# Patient Record
Sex: MALE | Race: WHITE | Employment: UNEMPLOYED | URBAN - METROPOLITAN AREA
[De-identification: names, ages, dates, MRNs, and addresses within clinical notes are randomized per-mention and may not be internally consistent; named-entity substitution may affect disease eponyms.]

---

## 2018-04-27 ENCOUNTER — HOSPITAL ENCOUNTER (OUTPATIENT)
Dept: NUCLEAR MEDICINE | Age: 48
Discharge: HOME | End: 2018-04-27
Payer: COMMERCIAL

## 2018-04-27 ENCOUNTER — HOSPITAL ENCOUNTER (OUTPATIENT)
Dept: NON INVASIVE DIAGNOSTICS | Age: 48
Discharge: HOME | End: 2018-04-27
Payer: COMMERCIAL

## 2018-04-27 VITALS — HEIGHT: 75 IN | WEIGHT: 295 LBS | BODY MASS INDEX: 36.68 KG/M2

## 2018-04-27 DIAGNOSIS — I20.9 ANGINAL SYNDROME (HCC): ICD-10-CM

## 2018-04-27 LAB
LV EF: 62 %
LVEF MODALITY: NORMAL

## 2018-04-27 PROCEDURE — 6360000002 HC RX W HCPCS: Performed by: FAMILY MEDICINE

## 2018-04-27 PROCEDURE — 93017 CV STRESS TEST TRACING ONLY: CPT

## 2018-04-27 PROCEDURE — 3430000000 HC RX DIAGNOSTIC RADIOPHARMACEUTICAL: Performed by: RADIOLOGY

## 2018-04-27 PROCEDURE — 78452 HT MUSCLE IMAGE SPECT MULT: CPT

## 2018-04-27 PROCEDURE — A9500 TC99M SESTAMIBI: HCPCS | Performed by: RADIOLOGY

## 2018-04-27 RX ORDER — ESOMEPRAZOLE MAGNESIUM 40 MG/1
40 CAPSULE, DELAYED RELEASE ORAL
COMMUNITY
End: 2018-07-06

## 2018-04-27 RX ORDER — RANITIDINE 150 MG/1
150 CAPSULE ORAL 2 TIMES DAILY
Status: ON HOLD | COMMUNITY
End: 2018-07-07 | Stop reason: HOSPADM

## 2018-04-27 RX ORDER — FENOFIBRATE 145 MG/1
145 TABLET, COATED ORAL DAILY
Status: ON HOLD | COMMUNITY
End: 2018-07-07 | Stop reason: HOSPADM

## 2018-04-27 RX ORDER — IBUPROFEN 800 MG/1
800 TABLET ORAL EVERY 8 HOURS PRN
Status: ON HOLD | COMMUNITY
End: 2018-07-07 | Stop reason: HOSPADM

## 2018-04-27 RX ADMIN — Medication 30 MILLICURIE: at 10:51

## 2018-04-27 RX ADMIN — Medication 10 MILLICURIE: at 08:58

## 2018-04-27 RX ADMIN — REGADENOSON 0.4 MG: 0.08 INJECTION, SOLUTION INTRAVENOUS at 11:13

## 2018-07-02 ENCOUNTER — HOSPITAL ENCOUNTER (OUTPATIENT)
Age: 48
Discharge: HOME | End: 2018-07-02
Payer: COMMERCIAL

## 2018-07-02 LAB
ANION GAP SERPL CALCULATED.3IONS-SCNC: 17 MMOL/L (ref 7–16)
APTT: 27.4 SEC (ref 24.5–35.1)
BUN BLDV-MCNC: 17 MG/DL (ref 6–20)
CALCIUM SERPL-MCNC: 9.4 MG/DL (ref 8.6–10.2)
CHLORIDE BLD-SCNC: 99 MMOL/L (ref 98–107)
CO2: 27 MMOL/L (ref 22–29)
CREAT SERPL-MCNC: 1 MG/DL (ref 0.7–1.2)
GFR AFRICAN AMERICAN: >60
GFR NON-AFRICAN AMERICAN: >60 ML/MIN/1.73
GLUCOSE BLD-MCNC: 97 MG/DL (ref 74–109)
HCT VFR BLD CALC: 45 % (ref 37–54)
HEMOGLOBIN: 15.7 G/DL (ref 12.5–16.5)
INR BLD: 0.9
MCH RBC QN AUTO: 31.8 PG (ref 26–35)
MCHC RBC AUTO-ENTMCNC: 34.9 % (ref 32–34.5)
MCV RBC AUTO: 91.1 FL (ref 80–99.9)
PDW BLD-RTO: 12 FL (ref 11.5–15)
PLATELET # BLD: 294 E9/L (ref 130–450)
PMV BLD AUTO: 10.4 FL (ref 7–12)
POTASSIUM SERPL-SCNC: 4.4 MMOL/L (ref 3.5–5)
PROTHROMBIN TIME: 10.2 SEC (ref 9.3–12.4)
RBC # BLD: 4.94 E12/L (ref 3.8–5.8)
SODIUM BLD-SCNC: 143 MMOL/L (ref 132–146)
WBC # BLD: 8.8 E9/L (ref 4.5–11.5)

## 2018-07-02 PROCEDURE — 85610 PROTHROMBIN TIME: CPT

## 2018-07-02 PROCEDURE — 80048 BASIC METABOLIC PNL TOTAL CA: CPT

## 2018-07-02 PROCEDURE — 85027 COMPLETE CBC AUTOMATED: CPT

## 2018-07-02 PROCEDURE — 36415 COLL VENOUS BLD VENIPUNCTURE: CPT

## 2018-07-02 PROCEDURE — 85730 THROMBOPLASTIN TIME PARTIAL: CPT

## 2018-07-06 ENCOUNTER — HOSPITAL ENCOUNTER (OUTPATIENT)
Dept: CARDIAC CATH/INVASIVE PROCEDURES | Age: 48
Discharge: HOME | End: 2018-07-07
Attending: INTERNAL MEDICINE | Admitting: INTERNAL MEDICINE
Payer: COMMERCIAL

## 2018-07-06 DIAGNOSIS — I25.10 CAD IN NATIVE ARTERY: ICD-10-CM

## 2018-07-06 LAB
ABO/RH: NORMAL
ANTIBODY SCREEN: NORMAL

## 2018-07-06 PROCEDURE — 86900 BLOOD TYPING SEROLOGIC ABO: CPT

## 2018-07-06 PROCEDURE — C1887 CATHETER, GUIDING: HCPCS

## 2018-07-06 PROCEDURE — C1769 GUIDE WIRE: HCPCS

## 2018-07-06 PROCEDURE — 86850 RBC ANTIBODY SCREEN: CPT

## 2018-07-06 PROCEDURE — 6370000000 HC RX 637 (ALT 250 FOR IP)

## 2018-07-06 PROCEDURE — C1874 STENT, COATED/COV W/DEL SYS: HCPCS

## 2018-07-06 PROCEDURE — 92928 PRQ TCAT PLMT NTRAC ST 1 LES: CPT | Performed by: INTERNAL MEDICINE

## 2018-07-06 PROCEDURE — 6360000002 HC RX W HCPCS

## 2018-07-06 PROCEDURE — 93458 L HRT ARTERY/VENTRICLE ANGIO: CPT | Performed by: INTERNAL MEDICINE

## 2018-07-06 PROCEDURE — C1725 CATH, TRANSLUMIN NON-LASER: HCPCS

## 2018-07-06 PROCEDURE — 36415 COLL VENOUS BLD VENIPUNCTURE: CPT

## 2018-07-06 PROCEDURE — C1760 CLOSURE DEV, VASC: HCPCS

## 2018-07-06 PROCEDURE — 2709999900 HC NON-CHARGEABLE SUPPLY

## 2018-07-06 PROCEDURE — 6370000000 HC RX 637 (ALT 250 FOR IP): Performed by: INTERNAL MEDICINE

## 2018-07-06 PROCEDURE — 2500000003 HC RX 250 WO HCPCS

## 2018-07-06 PROCEDURE — 2580000003 HC RX 258: Performed by: INTERNAL MEDICINE

## 2018-07-06 PROCEDURE — 86901 BLOOD TYPING SEROLOGIC RH(D): CPT

## 2018-07-06 PROCEDURE — C1894 INTRO/SHEATH, NON-LASER: HCPCS

## 2018-07-06 RX ORDER — ASPIRIN 81 MG/1
81 TABLET, CHEWABLE ORAL DAILY
Status: DISCONTINUED | OUTPATIENT
Start: 2018-07-07 | End: 2018-07-07 | Stop reason: HOSPADM

## 2018-07-06 RX ORDER — ROSUVASTATIN CALCIUM 10 MG/1
20 TABLET, COATED ORAL DAILY
Status: DISCONTINUED | OUTPATIENT
Start: 2018-07-06 | End: 2018-07-07 | Stop reason: HOSPADM

## 2018-07-06 RX ORDER — SODIUM CHLORIDE 0.9 % (FLUSH) 0.9 %
10 SYRINGE (ML) INJECTION PRN
Status: DISCONTINUED | OUTPATIENT
Start: 2018-07-06 | End: 2018-07-07 | Stop reason: HOSPADM

## 2018-07-06 RX ORDER — LISINOPRIL 5 MG/1
5 TABLET ORAL DAILY
Status: DISCONTINUED | OUTPATIENT
Start: 2018-07-06 | End: 2018-07-07 | Stop reason: HOSPADM

## 2018-07-06 RX ORDER — SODIUM CHLORIDE 9 MG/ML
INJECTION, SOLUTION INTRAVENOUS ONCE
Status: COMPLETED | OUTPATIENT
Start: 2018-07-06 | End: 2018-07-06

## 2018-07-06 RX ORDER — SODIUM CHLORIDE 9 MG/ML
INJECTION, SOLUTION INTRAVENOUS ONCE
Status: DISCONTINUED | OUTPATIENT
Start: 2018-07-06 | End: 2018-07-06

## 2018-07-06 RX ORDER — ACETAMINOPHEN 325 MG/1
650 TABLET ORAL EVERY 4 HOURS PRN
Status: DISCONTINUED | OUTPATIENT
Start: 2018-07-06 | End: 2018-07-07 | Stop reason: HOSPADM

## 2018-07-06 RX ORDER — SODIUM CHLORIDE 0.9 % (FLUSH) 0.9 %
10 SYRINGE (ML) INJECTION EVERY 12 HOURS SCHEDULED
Status: DISCONTINUED | OUTPATIENT
Start: 2018-07-06 | End: 2018-07-07 | Stop reason: HOSPADM

## 2018-07-06 RX ADMIN — LISINOPRIL 5 MG: 5 TABLET ORAL at 22:23

## 2018-07-06 RX ADMIN — ROSUVASTATIN CALCIUM 20 MG: 10 TABLET, FILM COATED ORAL at 22:22

## 2018-07-06 RX ADMIN — SODIUM CHLORIDE: 9 INJECTION, SOLUTION INTRAVENOUS at 13:22

## 2018-07-06 RX ADMIN — TICAGRELOR 90 MG: 90 TABLET ORAL at 22:23

## 2018-07-06 RX ADMIN — Medication 10 ML: at 22:23

## 2018-07-06 ASSESSMENT — PAIN SCALES - GENERAL
PAINLEVEL_OUTOF10: 0

## 2018-07-06 NOTE — H&P
History and Physical      CHIEF COMPLAINT:  Here for cardiac catheterization / PCI    History of Present Illness:  Stella Brannon is a 56y.o. year-old male presents for cardiac catheterization / PCI following a history of recurrent exertional retrosternal chest discomfort at least frequency with less exertion. He underwent a nuclear oncologic stress test which did not demonstrate evidence of ischemia continues to have recurrent indigestion like feeling with exertion and not with ingestion of food. GI series negative for GI pathology. Has multiple risk factors including combined hyperlipidemia, family history coronary artery disease and hypertension. Past Medical History:   Diagnosis Date    H/O cardiovascular stress test 12/21/2016    lexiscan    Hx of cardiovascular stress test 04/27/2018    lexiscan stress test    Hyperlipidemia     Hypertension          Past Surgical History:   Procedure Laterality Date    CARDIAC CATHETERIZATION Right 07/06/2018    Dr. Sanders Brochure x 2 LAD    JOINT REPLACEMENT      Left hip       Medications Prior to Admission:    Prior to Admission medications    Medication Sig Start Date End Date Taking?  Authorizing Provider   ranitidine (ZANTAC) 150 MG capsule Take 150 mg by mouth 2 times daily   Yes Historical Provider, MD   fenofibrate (TRICOR) 145 MG tablet Take 145 mg by mouth daily   Yes Historical Provider, MD   ibuprofen (ADVIL;MOTRIN) 800 MG tablet Take 800 mg by mouth every 8 hours as needed for Pain   Yes Historical Provider, MD   losartan (COZAAR) 50 MG tablet Take 50 mg by mouth daily   Yes Historical Provider, MD   atorvastatin (LIPITOR) 20 MG tablet Take 20 mg by mouth daily   Yes Historical Provider, MD   aspirin 81 MG chewable tablet Take 81 mg by mouth daily   Yes Historical Provider, MD   traMADol (ULTRAM) 50 MG tablet Take by mouth every 6 hours as needed for Pain   Yes Historical Provider, MD       Allergies:    Patient has no known 07/02/2018    MPV 10.4 07/02/2018     BMP:    Lab Results   Component Value Date     07/02/2018    K 4.4 07/02/2018    CL 99 07/02/2018    CO2 27 07/02/2018    BUN 17 07/02/2018    LABALBU 4.5 12/11/2014    LABALBU 4.4 05/02/2011    CREATININE 1.0 07/02/2018    CALCIUM 9.4 07/02/2018    GFRAA >60 07/02/2018    LABGLOM >60 07/02/2018    GLUCOSE 97 07/02/2018    GLUCOSE 89 05/02/2011     PT/INR:    Lab Results   Component Value Date    PROTIME 10.2 07/02/2018    INR 0.9 07/02/2018         ASSESSMENT:      Patient Active Problem List   Diagnosis    CAD in native artery       PLAN:  I have reviewed the indications, risks, and benefits of cardiac catheterization / PCI with Yanick Winter, and the patient states he fully understands and agrees to proceed. I have answered all questions posed to me by the patient.    SCAI-AUC  Indication 20; Score 7    Jesús Phillips,  FACP,FACC,FSCAI  7/6/2018                             h

## 2018-07-07 VITALS
OXYGEN SATURATION: 96 % | HEART RATE: 66 BPM | TEMPERATURE: 97.8 F | RESPIRATION RATE: 18 BRPM | BODY MASS INDEX: 36.68 KG/M2 | HEIGHT: 75 IN | DIASTOLIC BLOOD PRESSURE: 89 MMHG | SYSTOLIC BLOOD PRESSURE: 160 MMHG | WEIGHT: 295 LBS

## 2018-07-07 PROBLEM — M16.0 OSTEOARTHRITIS OF BOTH HIPS: Chronic | Status: ACTIVE | Noted: 2018-07-07

## 2018-07-07 PROBLEM — Z95.5 STATUS POST INSERTION OF DRUG ELUTING CORONARY ARTERY STENT: Status: ACTIVE | Noted: 2018-07-07

## 2018-07-07 PROBLEM — Z86.39 HX OF HYPERCHOLESTEROLEMIA: Chronic | Status: ACTIVE | Noted: 2018-07-06

## 2018-07-07 PROBLEM — I10 HYPERTENSION GOAL BP (BLOOD PRESSURE) < 130/80: Chronic | Status: ACTIVE | Noted: 2018-07-07

## 2018-07-07 PROBLEM — I20.9 ANGINA PECTORIS SYNDROME (HCC): Status: ACTIVE | Noted: 2018-07-07

## 2018-07-07 PROCEDURE — 2580000003 HC RX 258: Performed by: INTERNAL MEDICINE

## 2018-07-07 PROCEDURE — 6370000000 HC RX 637 (ALT 250 FOR IP): Performed by: INTERNAL MEDICINE

## 2018-07-07 RX ORDER — ROSUVASTATIN CALCIUM 20 MG/1
20 TABLET, COATED ORAL DAILY
Qty: 30 TABLET | Refills: 3 | Status: SHIPPED | OUTPATIENT
Start: 2018-07-07

## 2018-07-07 RX ORDER — PANTOPRAZOLE SODIUM 40 MG/1
40 TABLET, DELAYED RELEASE ORAL
Status: DISCONTINUED | OUTPATIENT
Start: 2018-07-08 | End: 2018-07-07 | Stop reason: HOSPADM

## 2018-07-07 RX ORDER — PANTOPRAZOLE SODIUM 40 MG/1
40 TABLET, DELAYED RELEASE ORAL
Qty: 30 TABLET | Refills: 3 | Status: SHIPPED | OUTPATIENT
Start: 2018-07-08

## 2018-07-07 RX ADMIN — Medication 10 ML: at 08:34

## 2018-07-07 RX ADMIN — ASPIRIN 81 MG 81 MG: 81 TABLET ORAL at 08:34

## 2018-07-07 RX ADMIN — LISINOPRIL 5 MG: 5 TABLET ORAL at 08:34

## 2018-07-07 RX ADMIN — TICAGRELOR 90 MG: 90 TABLET ORAL at 08:34

## 2018-07-07 ASSESSMENT — PAIN SCALES - GENERAL
PAINLEVEL_OUTOF10: 0

## 2018-07-07 NOTE — PLAN OF CARE
Problem: Tissue Perfusion - Peripheral, Altered:  Goal: Absence of hematoma at arterial access site  Absence of hematoma at arterial access site  Outcome: Met This Shift

## 2018-07-07 NOTE — PROGRESS NOTES
BP (!) 160/89   Pulse 66   Temp 97.8 °F (36.6 °C) (Temporal)   Resp 18   Ht 6' 3\" (1.905 m)   Wt 295 lb (133.8 kg)   SpO2 96%   BMI 36.87 kg/m²   Patient Vitals for the past 96 hrs (Last 3 readings):   Weight   07/06/18 1315 295 lb (133.8 kg)     OBJECTIVE:    HEENT: PERRL, EOM  Intact; sclera non-icteric, conjunctiva pink. Carotids are brisk in upstroke with normal contour. No carotid bruits. Normal jugular venous pulsation at 45°. No palpable cervical nor supraclavicular nodes. Thyroid not palpable. Trachea midline. Chest: Even excursion  Lungs: CTA B, no expiratory wheezes or rhonchi, no decreased tactile fremitus without inspiratory rales. Heart: Regular  rhythm; S1 > S2, no gallop or murmur. No clicks, rub, palpable thrills   or heaves. PMI nondisplaced, 5th intercostal space MCL. Abdomen: Soft, nontender, nondistended,  moderately protuberant, no masses or organomegaly. Bowel sounds active. Extremities: Without clubbing, cyanosis or edema. Pulses present 3+ upper extermities bilaterally; present 1+ DP and present 1+ PT bilaterally. Data:   Scheduled Meds: Reviewed  Continuous Infusions:     Intake/Output Summary (Last 24 hours) at 07/07/18 1546  Last data filed at 07/07/18 1210   Gross per 24 hour   Intake             2230 ml   Output             3300 ml   Net            -1070 ml     CBC: No results for input(s): WBC, HGB, HCT, PLT in the last 72 hours. BMP:No results for input(s): NA, K, CL, CO2, BUN, CREATININE, LABGLOM in the last 72 hours. Invalid input(s): GLU,  CA  ABGs: No results found for: PH, PO2, PCO2  INR: No results for input(s): INR in the last 72 hours. PRO-BNP: No results found for: PROBNP   TSH:   Lab Results   Component Value Date    TSH 4.378 08/27/2013      Cardiac Injury Profile: No results for input(s): CKTOTAL, CKMB, TROPONINI in the last 72 hours.    Lipid Profile: Lab Results   Component Value Date    TRIG 359 12/11/2014    HDL 38 12/11/2014 LDLCALC 168 12/11/2014    CHOL 278 12/11/2014      Hemoglobin A1C: No components found for: HGBA1C     RAD:   No results found. EKG: See Report  Echo: See Report      IMPRESSIONS:  Principal Problem:    Angina pectoris syndrome ([address]  Active Problems:    CAD in native artery    Status post insertion of drug eluting coronary artery stent    Osteoarthritis of both hips    Hx of hypercholesterolemia    Hypertension goal BP (blood pressure) < 130/80  Resolved Problems:    * No resolved hospital problems. *      RECOMMENDATIONS:  At this time Mr. sandoval was discharged to be followed up by Dr. Ramirez Leavitt and I will see him again in 3 months or sooner as clinically warranted. I have strongly urged him to discontinue smoking and have warned him of the potential cardiovascular side effects in addition to the known pulmonary complications. As for his anticipated hip surgery, this will wait until we can safely discontinue his antiplatelet agents. I have also informed the patient of the side effect of ticagrelor associated with increased respiratory rate. Finally, he is to maintain his LDL cholesterol less than 70 mg/dL as is recommended in 2017 updated ACC/AHA cholesterol guidelines. I have spent more than 30 minutes face to face with Kasey Will and reviewing notes and laboratory data, with greater than 50% of this time instructing and counseling the patient face to face regarding my findings and recommendations and I have answered all questions as posed to me by  Maranda. Konrad Zambrano, DO FACP,FACC,[facility]      NOTE:  This report was transcribed using voice recognition software.   Every effort was made to ensure accuracy; however, inadvertent computerized transcription errors may be present

## 2018-07-07 NOTE — PLAN OF CARE
Problem: Tissue Perfusion - Peripheral, Altered:  Goal: Absence of hematoma at arterial access site  Absence of hematoma at arterial access site   Outcome: Ongoing

## 2018-07-07 NOTE — PROCEDURES
superior proximal left anterior  descending stenosis. Next, with some difficulty, a PT2 angled exchange  wire was passed to the distal left anterior descending artery followed by a  2.5 x 12 mm [place] balloon, which was inflated on two occasions to as high as  8 atmospheres. Repeat injections now revealed residual narrowing with  modest improvement and for this reason, a 2.5 x 20 mm Synergy drug-eluting  stent was deployed at 17 atmospheres. Repeat injections now demonstrated  residual 65% stenosis at the edge of the stent. For this reason, a second  2.25 x 16 mm Synergy stent was deployed at 18 atmospheres in the mid LAD. Repeat injections now revealed 0% residual narrowing. As the patient was  stable, the guiding catheter was removed. The sheath removed and replaced  with hemostatic device which was deployed. A 6-Pasht[place] Exoseal device,  which was deployed without complication. The patient was given 180 mg of  ticagrelor and 325 mg of aspirin and returned to his room. CONCLUSION:  Successful coronary artery intervention, proximal left  anterior descending artery and mid anterior descending. ESTIMATED BLOOD LOSS:  Less than 10 mL. TOTAL CONTRAST UTILIZED:  146 mL. FLUOROSCOPY TIME:  10 minutes. INITIATION OF CONSCIOUS SEDATION:  1455. COMPLETION OF CONSCIOUS SEDATION:  0752. JOSE DAVID DRIVER DO    D: 07/07/2018 0:33:47       T: 07/07/2018 1:31:00     [email or web address]  Job#: [id number]     Doc#: [id number]    CC:   Kay Valerio DO

## 2018-07-07 NOTE — CONSULTS
Counseled about making positive lifestyle changes and attending outpatient cardiac rehab upon discharge. Thank you!

## 2018-07-07 NOTE — PROCEDURES
LVEF: ***%    Coronary Anatomy:  A. Left Coronary Artery  1. Left main trunk: ***  2. Left Anterior Descending: ***  3. Left Circumflex: ***  4. Ramus Intermedius: ***    B: Right Coronary Artery (Preponderant)  Right coronary artery: ***     II PCILAD:  Following cardiac catheterization a 6-Citizen of [place] introducer sheath was passed into the right common femoral artery and flushed with a rise serially. Bivalirudin was initiated. L4 guiding catheter was advanced over guidewire to the aortic root injection of the left coronary artery demonstrating a 99% eccentric stenosis in the junction of the proximal mid 3rd segment of the left anterior descending artery. Next the PT to undergo exchange wire was passed in the distal anterior descending artery followed by 2.5 x 12 mm lumen which was inflated on 2 occasions. Injections now revealing moderate improvement but with residual narrowing and for this reason a 2.5 x 15 mm Synergy drug-eluting stent was deployed. Injections now revealed wide patency in the area of previous stenosis but there appeared to be a residual narrowing immediately beneath this level reason a 2nd 2.25 x 15 mm stent was deployed at 813 harper repeat injections now revealing 0% residual stenosis. The guiding catheter was then removed and adequate hemostasis was obtained following deployment of a 6-Citizen [place] EXO- SEAL hemostatic device. No complications noted. Impression:  1. ***        Electronically Signed by: Deshaun Crocker DO FACP [facility]      NOTE:  This report was transcribed using voice recognition software.   Every effort was made to ensure accuracy; however, inadvertent computerized transcription errors may be present

## 2021-07-09 VITALS — DIASTOLIC BLOOD PRESSURE: 82 MMHG | SYSTOLIC BLOOD PRESSURE: 128 MMHG

## 2021-07-09 RX ORDER — CHOLECALCIFEROL (VITAMIN D3) 1250 MCG
CAPSULE ORAL
COMMUNITY